# Patient Record
Sex: FEMALE | Race: WHITE | NOT HISPANIC OR LATINO | Employment: OTHER | URBAN - METROPOLITAN AREA
[De-identification: names, ages, dates, MRNs, and addresses within clinical notes are randomized per-mention and may not be internally consistent; named-entity substitution may affect disease eponyms.]

---

## 2017-01-25 ENCOUNTER — OFFICE VISIT (OUTPATIENT)
Dept: ORTHOPEDIC SURGERY | Facility: CLINIC | Age: 62
End: 2017-01-25

## 2017-01-25 DIAGNOSIS — Z98.890 S/P ROTATOR CUFF REPAIR: Primary | ICD-10-CM

## 2017-01-25 DIAGNOSIS — M54.12 CERVICAL RADICULOPATHY: ICD-10-CM

## 2017-01-25 PROCEDURE — 99212 OFFICE O/P EST SF 10 MIN: CPT | Performed by: ORTHOPAEDIC SURGERY

## 2017-01-25 NOTE — PROGRESS NOTES
"Jolie Del Cid : 1955 MRN: [id number] DATE: 2017      CC: 3 months s/p right shoulder rotator cuff repair    HPI: Pt. returns to clinic today stating pain is improved.  Motion is progressing.  Denies any new concerns or issues.  She admits that she has been non-compliant with the therapy recommendation.  She still has soreness in the shoulder and occasionally down the arm but she does admit that it is better than before surgery and seems to be steadily improving.  She also reports the occasional sensation that the arm \"goes dead\".      Current Outpatient Prescriptions:   •  buPROPion XL (WELLBUTRIN XL) 300 MG 24 hr tablet, Take 300 mg by mouth every morning., Disp: , Rfl:   •  buPROPion XL (WELLBUTRIN XL) 300 MG 24 hr tablet, TAKE ONE TABLET BY MOUTH DAILY, Disp: 30 tablet, Rfl: 0  •  carisoprodol (SOMA) 350 MG tablet, Take 1 tablet by mouth 4 (Four) Times a Day As Needed for muscle spasms., Disp: 120 tablet, Rfl: 1  •  HYDROcodone-acetaminophen (NORCO)  MG per tablet, Take 1 tablet by mouth Every 4 (Four) Hours As Needed for moderate pain (4-6)., Disp: 180 tablet, Rfl: 0  •  lisinopril-hydrochlorothiazide (PRINZIDE,ZESTORETIC) 20-25 MG per tablet, Take 1 tablet by mouth every morning., Disp: , Rfl:   •  lisinopril-hydrochlorothiazide (PRINZIDE,ZESTORETIC) 20-25 MG per tablet, TAKE ONE TABLET BY MOUTH DAILY, Disp: 30 tablet, Rfl: 0  •  LORazepam (ATIVAN) 1 MG tablet, Take 1 tablet by mouth Every 6 (Six) Hours As Needed for anxiety., Disp: 120 tablet, Rfl: 0  •  oxyCODONE (ROXICODONE) 10 MG tablet, Take 1 tablet by mouth Every 6 (Six) Hours As Needed for moderate pain (4-6)., Disp: 120 tablet, Rfl: 0  •  vitamin D (ERGOCALCIFEROL) 76235 UNITS capsule capsule, Take 1 capsule by mouth every 7 days. (Patient taking differently: Take 50,000 Units by mouth every 30 (thirty) days.), Disp: 4 capsule, Rfl: 3    Past Medical History   Diagnosis Date   • Anxiety      Anxiety and Stress  Anxiety/ Panic Attacks "   • Arm pain      severe and worsening  worsening by fall   • Back pain      ongoing treatment  Low back Pain with worsening spasms  due to recent move....needs breakthrough meds   • Chest congestion      ON RECENT ANTIBX   • Chest pain      rare   • Claustrophobia      WITH PANIC ATTACKS   • Encounter for annual health examination 03/13/2014     Annual Health Assessment   • Hand dysfunction      Dysfunctional right hand with dislocating right thumb   • Headache      Headaches due to BP accelerated   • Hyperlipidemia      off Lipitor  starting Lipitor and encouraged highly to take   • Hypertension      accelerated due to anxiety  better control  poorly controlled   • Injury of neck    • Intractable pain      and discomfort due to chronic arm injury and pain   • Muscle spasm of left shoulder      Left shoulder spasms after trip to [place]   • Neck pain    • Neuralgia      of Hands  Right arm Neuralgia and swelling   • Nosebleed      when off her BP meds   • Panic attack      when off Lexapro   • Poor compliance with medication      Poor medication compliance   • Pregnancy      Total Pregnancies: 2  Full Term: 2  Number of Living Children: 2   • Right arm pain    • Right eye trauma      Right eye trauma/hemical conjunctivitis   • Right hip pain    • Rotator cuff tear, right    • Sensitive skin    • Shoulder pain, right    • Situational anxiety      treated with Lorazepam built declines lexapro   • Strain of supraspinatus muscle      Supraspinatus muscle tear - ongoing, declines repair, okay with water therapy..refe...   • Viral gastroenteritis    • Wellness examination 11/20/2014     Annual Wellness Visit       Past Surgical History   Procedure Laterality Date   • Elbow procedure       x3   • Hysterectomy     • Colonoscopy       Every 10 Years   • [place] tooth extraction     • Tonsillectomy     • Appendectomy     • Carpal tunnel release Bilateral    • First rib resection     • Shoulder arthroscopy w/ rotator  cuff repair Right 9/20/2016     Procedure:  RIGHT SHOULDER ARTHROSCOPY WITH ROTATOR CUFF REPAIR AND BICEPS TENDONESIS;  Surgeon: Aris Andres MD;  [place] [facility] [place] [facility] [place]  Service:    • Back surgery  1981     Lower back scoliosis surgery   • Elbow procedure Left 1993     Left elbow tennis elbow nerve movement   • Carpal tunnel release  1995     Atasioy carpel Tunnel   • Shoulder surgery Right 2002     Atasioy right shoulder   • Other surgical history Right 08/01/2003     SURG   Right Arm Atasoy       Family History   Problem Relation Age of Onset   • Cancer Other      Prostate   • Heart disease Other    • Hypertension Other    • Arthritis Other    • Skin cancer Other    • Other Other      SLE       Social History     Social History   • Marital status:      Spouse name: N/A   • Number of children: N/A   • Years of education: N/A     Occupational History   • Not on file.     Social History Main Topics   • Smoking status: Current Every Day Smoker     Packs/day: 0.25     Years: 20.00     Types: Cigarettes   • Smokeless tobacco: Not on file      Comment: 1-2 CIGARETTES/ DAY   • Alcohol use No   • Drug use: No   • Sexual activity: Defer     Other Topics Concern   • Not on file     Social History Narrative     Exam:   Contour of shoulder appears normal.  Skin intact and benign.  Arm and forearm soft.  Motion is excellent--still lacking several levels of IR.  Good motor and sensory function distally.  Palpable pulses with good cap refill.      Imaging   none    Impression:  3 months s/p right shoulder rotator cuff repair, cervical radiculopathy    Plan:    1.  Progress ROM and strengthening as tolerated.  2.  Continue PT per protocol.  3.  F/u in 6 weeks.  4.  I think some of her pain is coming from her neck but we still need to give the shoulder more time.  I counseled her that soreness at this point is still normal.

## 2017-01-25 NOTE — MR AVS SNAPSHOT
Jolie Del Cid   1/25/2017 10:45 AM   Office Visit    Dept Phone:  [phone]   Encounter #:  [id number]    Provider:  Aris Andres MD   Department:  [facility]                Your Full Care Plan              Your Updated Medication List          This list is accurate as of: 1/25/17 11:45 AM.  Always use your most recent med list.                * buPROPion  MG 24 hr tablet   Commonly known as:  WELLBUTRIN XL       * buPROPion  MG 24 hr tablet   Commonly known as:  WELLBUTRIN XL   TAKE ONE TABLET BY MOUTH DAILY       carisoprodol 350 MG tablet   Commonly known as:  SOMA   Take 1 tablet by mouth 4 (Four) Times a Day As Needed for muscle spasms.       HYDROcodone-acetaminophen  MG per tablet   Commonly known as:  NORCO   Take 1 tablet by mouth Every 4 (Four) Hours As Needed for moderate pain (4-6).       * lisinopril-hydrochlorothiazide 20-25 MG per tablet   Commonly known as:  PRINZIDE,ZESTORETIC       * lisinopril-hydrochlorothiazide 20-25 MG per tablet   Commonly known as:  PRINZIDE,ZESTORETIC   TAKE ONE TABLET BY MOUTH DAILY       LORazepam 1 MG tablet   Commonly known as:  ATIVAN   Take 1 tablet by mouth Every 6 (Six) Hours As Needed for anxiety.       oxyCODONE 10 MG tablet   Commonly known as:  ROXICODONE   Take 1 tablet by mouth Every 6 (Six) Hours As Needed for moderate pain (4-6).       vitamin D 08247 UNITS capsule capsule   Commonly known as:  ERGOCALCIFEROL   Take 1 capsule by mouth every 7 days.       * Notice:  This list has 4 medication(s) that are the same as other medications prescribed for you. Read the directions carefully, and ask your doctor or other care provider to review them with you.            You Were Diagnosed With        Codes Comments    S/P rotator cuff repair    -  Primary ICD-10-CM: Z98.890  ICD-9-CM: V45.89     Cervical radiculopathy     ICD-10-CM: M54.12  ICD-9-CM: 723.4          Instructions     None    Patient Instructions History      Upcoming Appointments     Visit Type Date Time Department    FOLLOW UP 1/25/2017 10:45 AM [facility]    FOLLOW UP 3/10/2017 11:25 AM [facility]    FOLLOW UP 4/17/2017  3:10 PM [facility] Signup     Our records indicate that you have an active [facility] [employer] account.    You can view your After Visit Summary by going to [employer] and logging in with your [employer] username and password.  If you don't have a [employer] username and password but a parent or guardian has access to your record, the parent or guardian should login with their own [employer] username and password and access your record to view the After Visit Summary.    If you have questions, you can email [email or web address] or call [phone] to talk to our Riskified staff.  Remember, Riskified is NOT to be used for urgent needs.  For medical emergencies, dial 911.               Other Info from Your Visit           Your Appointments     Mar 10, 2017 11:25 AM EST   Follow Up with Aris Andres MD   [facility] (--)    [address]  [place] [address] [phone]           Arrive 15 minutes prior to appointment.            Apr 17, 2017  3:10 PM EDT   Follow Up with Sohan Rene MD   [facility] (--)    [address]  [place] [facility] [address] [phone]           Arrive 15 minutes prior to appointment.              Allergies     Ibuprofen  Hives    Lexapro [Escitalopram Oxalate]  Nausea And Vomiting, Anxiety, Other (See Comments)    Reaction: Drunk feeling/ Flying high/ Increased anxiety    Pristiq [Desvenlafaxine]  Nausea And Vomiting, Other (See Comments)    Reaction: mean      Reason for Visit     Right Shoulder - Pain, Follow-up           Vital Signs     Last Menstrual Period Smoking Status                (LMP  Unknown) Current Every Day Smoker          Problems and Diagnoses Noted     S/P rotator cuff repair    Cervical nerve root disorder

## 2017-01-26 DIAGNOSIS — F41.9 ANXIETY: ICD-10-CM

## 2017-01-26 RX ORDER — LORAZEPAM 1 MG/1
TABLET ORAL
Qty: 120 TABLET | Refills: 0 | OUTPATIENT
Start: 2017-01-26 | End: 2017-02-27 | Stop reason: SDUPTHER

## 2017-02-07 DIAGNOSIS — M75.21 BICIPITAL TENDONITIS OF RIGHT SHOULDER: ICD-10-CM

## 2017-02-07 NOTE — TELEPHONE ENCOUNTER
----- Message from Cally Segura sent at 2/7/2017  2:49 PM EST -----   [phone]  Refill on HYDROcodone-acetaminophen (NORCO)  MG per tablet    lov 12/28/16, rayray due 3/28/17

## 2017-02-08 DIAGNOSIS — M75.21 BICIPITAL TENDONITIS OF RIGHT SHOULDER: ICD-10-CM

## 2017-02-08 RX ORDER — OXYCODONE HYDROCHLORIDE 10 MG/1
10 TABLET ORAL EVERY 6 HOURS PRN
Qty: 120 TABLET | Refills: 0 | Status: SHIPPED | OUTPATIENT
Start: 2017-02-08 | End: 2017-03-13 | Stop reason: SDUPTHER

## 2017-02-08 RX ORDER — HYDROCODONE BITARTRATE AND ACETAMINOPHEN 10; 325 MG/1; MG/1
1 TABLET ORAL EVERY 4 HOURS PRN
Qty: 180 TABLET | Refills: 0 | Status: SHIPPED | OUTPATIENT
Start: 2017-02-08 | End: 2017-03-22 | Stop reason: SDUPTHER

## 2017-02-08 NOTE — TELEPHONE ENCOUNTER
----- Message from Petra Marshall sent at 2/8/2017  4:22 PM EST -----  Refill oxycodone 10 mg    Stated her  called in wrong med

## 2017-02-09 RX ORDER — CARISOPRODOL 350 MG/1
TABLET ORAL
Qty: 120 TABLET | Refills: 0 | OUTPATIENT
Start: 2017-02-09 | End: 2017-03-14 | Stop reason: SDUPTHER

## 2017-02-27 DIAGNOSIS — F41.9 ANXIETY: ICD-10-CM

## 2017-02-27 RX ORDER — BUPROPION HYDROCHLORIDE 300 MG/1
TABLET ORAL
Qty: 30 TABLET | Refills: 0 | Status: SHIPPED | OUTPATIENT
Start: 2017-02-27 | End: 2017-03-24 | Stop reason: SDUPTHER

## 2017-02-27 RX ORDER — LORAZEPAM 1 MG/1
TABLET ORAL
Qty: 120 TABLET | Refills: 0 | OUTPATIENT
Start: 2017-02-27 | End: 2017-03-22 | Stop reason: SDUPTHER

## 2017-02-27 RX ORDER — LISINOPRIL AND HYDROCHLOROTHIAZIDE 25; 20 MG/1; MG/1
TABLET ORAL
Qty: 30 TABLET | Refills: 0 | Status: SHIPPED | OUTPATIENT
Start: 2017-02-27

## 2017-03-13 ENCOUNTER — TELEPHONE (OUTPATIENT)
Dept: FAMILY MEDICINE CLINIC | Facility: CLINIC | Age: 62
End: 2017-03-13

## 2017-03-13 DIAGNOSIS — M75.21 BICIPITAL TENDONITIS OF RIGHT SHOULDER: ICD-10-CM

## 2017-03-13 RX ORDER — OXYCODONE HYDROCHLORIDE 10 MG/1
10 TABLET ORAL EVERY 6 HOURS PRN
Qty: 120 TABLET | Refills: 0 | Status: SHIPPED | OUTPATIENT
Start: 2017-03-13 | End: 2017-03-22 | Stop reason: SDUPTHER

## 2017-03-13 NOTE — TELEPHONE ENCOUNTER
----- Message from Shruti Ornelas sent at 3/13/2017 10:03 AM EDT -----  Regarding: RX REQUEST  oxyCODONE (ROXICODONE) 10 MG tablet

## 2017-03-14 RX ORDER — CARISOPRODOL 350 MG/1
TABLET ORAL
Qty: 120 TABLET | Refills: 0 | OUTPATIENT
Start: 2017-03-14 | End: 2017-04-11 | Stop reason: SDUPTHER

## 2017-03-14 RX ORDER — CARISOPRODOL 350 MG/1
TABLET ORAL
Qty: 120 TABLET | Refills: 0 | OUTPATIENT
Start: 2017-03-14 | End: 2017-03-24 | Stop reason: SDUPTHER

## 2017-03-22 DIAGNOSIS — M75.21 BICIPITAL TENDONITIS OF RIGHT SHOULDER: ICD-10-CM

## 2017-03-22 DIAGNOSIS — F41.9 ANXIETY: ICD-10-CM

## 2017-03-22 RX ORDER — HYDROCODONE BITARTRATE AND ACETAMINOPHEN 10; 325 MG/1; MG/1
1 TABLET ORAL EVERY 4 HOURS PRN
Qty: 180 TABLET | Refills: 0 | Status: SHIPPED | OUTPATIENT
Start: 2017-03-22 | End: 2017-04-17 | Stop reason: SDUPTHER

## 2017-03-22 RX ORDER — LORAZEPAM 1 MG/1
0.5 TABLET ORAL EVERY 6 HOURS PRN
Qty: 120 TABLET | Refills: 0 | Status: SHIPPED | OUTPATIENT
Start: 2017-03-22 | End: 2017-04-17 | Stop reason: SDUPTHER

## 2017-03-22 RX ORDER — OXYCODONE HYDROCHLORIDE 10 MG/1
10 TABLET ORAL EVERY 6 HOURS PRN
Qty: 120 TABLET | Refills: 0 | Status: SHIPPED | OUTPATIENT
Start: 2017-03-22 | End: 2017-04-17 | Stop reason: SDUPTHER

## 2017-03-24 ENCOUNTER — OFFICE VISIT (OUTPATIENT)
Dept: ORTHOPEDIC SURGERY | Facility: CLINIC | Age: 62
End: 2017-03-24

## 2017-03-24 VITALS — BODY MASS INDEX: 22.68 KG/M2 | HEIGHT: 63 IN | TEMPERATURE: 97.4 F | WEIGHT: 128 LBS

## 2017-03-24 DIAGNOSIS — Z09 SURGERY FOLLOW-UP: Primary | ICD-10-CM

## 2017-03-24 PROCEDURE — 99212 OFFICE O/P EST SF 10 MIN: CPT | Performed by: ORTHOPAEDIC SURGERY

## 2017-03-24 RX ORDER — LISINOPRIL AND HYDROCHLOROTHIAZIDE 25; 20 MG/1; MG/1
TABLET ORAL
Qty: 30 TABLET | Refills: 0 | Status: SHIPPED | OUTPATIENT
Start: 2017-03-24 | End: 2021-12-01

## 2017-03-24 RX ORDER — BUPROPION HYDROCHLORIDE 300 MG/1
TABLET ORAL
Qty: 30 TABLET | Refills: 0 | Status: SHIPPED | OUTPATIENT
Start: 2017-03-24 | End: 2017-04-16 | Stop reason: SDUPTHER

## 2017-03-24 NOTE — PROGRESS NOTES
"Chief Complaint:  Follow up s/p right rotator cuff repair    HPI:  Jolie is now roughly 5 months out from surgery.  She tells me the shoulder is doing \"okay\", as she states it.  She still has intermittent pain shooting down the arm as well as intermittent spasming.  She thinks that this is probably coming from the neck.  She describes her pain as moderate, intermittent, and sharp.  When asked to localize her pain, she says that it shoots all the way down her arm.  She also gets occasional pain shooting up into her neck.  Denies any new weakness or numbness.  She has experienced some occasional paresthesias.    Exam:  Her incisions are healed.  Motion is near full relative to the contralateral side.  She still has some residual weakness with elevation in scapular plane but no significant pain.  Neck motion is uncomfortable and a Spurling's maneuver reproduces neck pain only.    Imaging:  None    Assessment:  5 months s/p right rotator cuff repair    Plan:  Overall, I think she has done fairly well with the shoulder surgery.  She still has some pain and to what degree this is coming from the shoulder as opposed to the neck is difficult to determine.  I assured her that it can take up to a year to recover full motion and function and for all of the pain to subside after rotator cuff repair.  It is possible that she has a recurrent rotator cuff tear but I have assured her that that is not causing the pain shooting all the way down her arm or the paresthesias.  At this point, I think it would be reasonable to have her try epidurals for the neck to see if those help.  Alternatively, she can go back and talk to Dr. Johnson and see what he recommends.  She wants to adan it over.  She will let me know how she wants to proceed.  I will wait to hear from her    Aris Andres MD  03/24/2017      "

## 2017-04-11 RX ORDER — CARISOPRODOL 350 MG/1
TABLET ORAL
Qty: 120 TABLET | Refills: 0 | Status: SHIPPED | OUTPATIENT
Start: 2017-04-11 | End: 2017-05-08 | Stop reason: SDUPTHER

## 2017-04-17 ENCOUNTER — OFFICE VISIT (OUTPATIENT)
Dept: FAMILY MEDICINE CLINIC | Facility: CLINIC | Age: 62
End: 2017-04-17

## 2017-04-17 VITALS
DIASTOLIC BLOOD PRESSURE: 84 MMHG | HEIGHT: 63 IN | TEMPERATURE: 98.4 F | OXYGEN SATURATION: 98 % | SYSTOLIC BLOOD PRESSURE: 136 MMHG | WEIGHT: 125 LBS | HEART RATE: 92 BPM | BODY MASS INDEX: 22.15 KG/M2

## 2017-04-17 DIAGNOSIS — I10 HYPERTENSION, ESSENTIAL: ICD-10-CM

## 2017-04-17 DIAGNOSIS — G90.50 COMPLEX REGIONAL PAIN SYNDROME TYPE 1, AFFECTING UNSPECIFIED SITE: ICD-10-CM

## 2017-04-17 DIAGNOSIS — M75.21 BICIPITAL TENDONITIS OF RIGHT SHOULDER: ICD-10-CM

## 2017-04-17 DIAGNOSIS — Z79.899 ENCOUNTER FOR LONG-TERM (CURRENT) USE OF MEDICATIONS: ICD-10-CM

## 2017-04-17 DIAGNOSIS — E78.5 HYPERLIPIDEMIA, UNSPECIFIED HYPERLIPIDEMIA TYPE: ICD-10-CM

## 2017-04-17 DIAGNOSIS — G89.4 PAIN SYNDROME, CHRONIC: ICD-10-CM

## 2017-04-17 DIAGNOSIS — F32.A ANXIETY AND DEPRESSION: Primary | ICD-10-CM

## 2017-04-17 DIAGNOSIS — F41.9 ANXIETY: ICD-10-CM

## 2017-04-17 DIAGNOSIS — F41.9 ANXIETY AND DEPRESSION: Primary | ICD-10-CM

## 2017-04-17 PROCEDURE — 99214 OFFICE O/P EST MOD 30 MIN: CPT | Performed by: INTERNAL MEDICINE

## 2017-04-17 RX ORDER — BUPROPION HYDROCHLORIDE 300 MG/1
TABLET ORAL
Qty: 30 TABLET | Refills: 0 | Status: SHIPPED | OUTPATIENT
Start: 2017-04-17 | End: 2017-05-20 | Stop reason: SDUPTHER

## 2017-04-17 RX ORDER — OXYCODONE HYDROCHLORIDE 10 MG/1
10 TABLET ORAL EVERY 6 HOURS PRN
Qty: 90 TABLET | Refills: 0 | Status: SHIPPED | OUTPATIENT
Start: 2017-04-17 | End: 2017-06-14 | Stop reason: SDUPTHER

## 2017-04-17 RX ORDER — LORAZEPAM 1 MG/1
1 TABLET ORAL EVERY 6 HOURS PRN
Qty: 120 TABLET | Refills: 0 | Status: SHIPPED | OUTPATIENT
Start: 2017-04-17 | End: 2017-05-23 | Stop reason: SDUPTHER

## 2017-04-17 RX ORDER — HYDROCODONE BITARTRATE AND ACETAMINOPHEN 10; 325 MG/1; MG/1
1 TABLET ORAL EVERY 4 HOURS PRN
Qty: 180 TABLET | Refills: 0 | Status: SHIPPED | OUTPATIENT
Start: 2017-04-17 | End: 2017-06-14 | Stop reason: SDUPTHER

## 2017-04-17 RX ORDER — LISINOPRIL AND HYDROCHLOROTHIAZIDE 25; 20 MG/1; MG/1
TABLET ORAL
Qty: 30 TABLET | Refills: 0 | Status: SHIPPED | OUTPATIENT
Start: 2017-04-17 | End: 2021-12-01

## 2017-04-17 RX ORDER — HYDROCODONE BITARTRATE AND ACETAMINOPHEN 10; 325 MG/1; MG/1
1 TABLET ORAL EVERY 4 HOURS PRN
Qty: 180 TABLET | Refills: 0 | Status: SHIPPED | OUTPATIENT
Start: 2017-04-17 | End: 2017-04-17 | Stop reason: SDUPTHER

## 2017-04-17 NOTE — PROGRESS NOTES
QUICK REFERENCE INFORMATION:  The ABCs of the Annual Wellness Visit    Subsequent Medicare Wellness Visit    HEALTH RISK ASSESSMENT    1955    Recent Hospitalizations:  No recent hospitalization(s)..        Current Medical Providers:  Patient Care Team:  Sohan Rene MD as PCP - General        Smoking Status:  History   Smoking Status   • Current Every Day Smoker   • Packs/day: 0.25   • Years: 20.00   • Types: Cigarettes   Smokeless Tobacco   • Never Used     Comment: 1-2 CIGARETTES/ DAY       Alcohol Consumption:  History   Alcohol Use No       Depression Screen:   No flowsheet data found.    Health Habits and Functional and Cognitive Screening:  No flowsheet data found.           Does the patient have evidence of cognitive impairment? No    Aspirin use counseling: Taking ASA appropriately as indicated      Recent Lab Results:  CMP:  Lab Results   Component Value Date    BUN 16 09/15/2016    CREATININE 0.82 09/15/2016    EGFRIFNONA 70 09/15/2016    EGFRIFAFRI  09/15/2016      Comment:      <15 Indicative of kidney failure.    BCR 19.5 09/15/2016     09/15/2016    K 4.2 09/15/2016    CO2 22.9 09/15/2016    CALCIUM 10.2 09/15/2016     Lipid Panel:     HbA1c:       Visual Acuity:  No exam data present    Age-appropriate Screening Schedule:  Refer to the list below for future screening recommendations based on patient's age, sex and/or medical conditions. Orders for these recommended tests are listed in the plan section. The patient has been provided with a written plan.    Health Maintenance   Topic Date Due   • PNEUMOCOCCAL VACCINE (19-64 MEDIUM RISK) (1 of 1 - PPSV23) 09/04/1972   • TDAP/TD VACCINES (1 - Tdap) 09/04/1972   • MAMMOGRAM  05/09/2016   • PAP SMEAR  05/09/2016   • COLONOSCOPY  05/09/2016   • ZOSTER VACCINE  05/09/2016   • INFLUENZA VACCINE  08/01/2016   • LIPID PANEL  09/06/2016        Subjective   History of Present Illness    Jolie Del Cid is a 61 y.o. female who presents for an Subsequent  Wellness Visit.    The following portions of the patient's history were reviewed and updated as appropriate: She  has a past medical history of Anxiety; Arm pain; Back pain; Chest congestion; Chest pain; Claustrophobia; Encounter for annual health examination (03/13/2014); Hand dysfunction; Headache; Hyperlipidemia; Hypertension; Injury of neck; Intractable pain; Muscle spasm of left shoulder; Neck pain; Neuralgia; Nosebleed; Panic attack; Poor compliance with medication; Pregnancy; Right arm pain; Right eye trauma; Right hip pain; Rotator cuff tear, right; Sensitive skin; Shoulder pain, right; Situational anxiety; Strain of supraspinatus muscle; Viral gastroenteritis; and Wellness examination (11/20/2014).  She has Hypertension, essential; Hyperlipidemia intolerant to statin; Intractable neuropathic pain of upper extremity; Supraspinatus tendon tear (Andres); Headache; Pain syndrome, chronic; Vitamin D deficiency; and Complex regional pain syndrome on her pertinent problem list.  She  has a past surgical history that includes Elbow surgery; Hysterectomy; Colonoscopy; [place] tooth extraction; Tonsillectomy; Appendectomy; Carpal tunnel release (Bilateral); First rib removal; Shoulder arthroscopy w/ rotator cuff repair (Right, 9/20/2016); Back surgery (1981); Elbow surgery (Left, 1993); Carpal tunnel release (1995); Shoulder surgery (Right, 2002); and Other surgical history (Right, 08/01/2003).  Her family history includes Arthritis in her other; Cancer in her other; Heart disease in her other; Hypertension in her other; Other in her other; Skin cancer in her other.  She  reports that she has been smoking Cigarettes.  She has a 5.00 pack-year smoking history. She has never used smokeless tobacco. She reports that she does not drink alcohol or use illicit drugs.  Current Outpatient Prescriptions   Medication Sig Dispense Refill   • buPROPion XL (WELLBUTRIN XL) 300 MG 24 hr tablet TAKE ONE TABLET BY MOUTH DAILY 30  tablet 0   • carisoprodol (SOMA) 350 MG tablet TAKE 1 TABLET BY MOUTH FOUR TIMES DAILY AS NEEDED MUSCLE SPASM 120 tablet 0   • HYDROcodone-acetaminophen (NORCO)  MG per tablet Take 1 tablet by mouth Every 4 (Four) Hours As Needed for Moderate Pain (4-6). 180 tablet 0   • lisinopril-hydrochlorothiazide (PRINZIDE,ZESTORETIC) 20-25 MG per tablet TAKE ONE TABLET BY MOUTH DAILY 30 tablet 0   • lisinopril-hydrochlorothiazide (PRINZIDE,ZESTORETIC) 20-25 MG per tablet TAKE ONE TABLET BY MOUTH DAILY 30 tablet 0   • lisinopril-hydrochlorothiazide (PRINZIDE,ZESTORETIC) 20-25 MG per tablet TAKE ONE TABLET BY MOUTH DAILY 30 tablet 0   • LORazepam (ATIVAN) 1 MG tablet Take 1 tablet by mouth Every 6 (Six) Hours As Needed for Anxiety. Okay to fill on 4-17-17 120 tablet 0   • oxyCODONE (ROXICODONE) 10 MG tablet Take 1 tablet by mouth Every 6 (Six) Hours As Needed for Moderate Pain (4-6) or Severe Pain (7-10). 90 tablet 0   • vitamin D (ERGOCALCIFEROL) 17831 UNITS capsule capsule Take 1 capsule by mouth every 7 days. (Patient taking differently: Take 50,000 Units by mouth every 30 (thirty) days.) 4 capsule 3     No current facility-administered medications for this visit.      Current Outpatient Prescriptions on File Prior to Visit   Medication Sig   • buPROPion XL (WELLBUTRIN XL) 300 MG 24 hr tablet TAKE ONE TABLET BY MOUTH DAILY   • carisoprodol (SOMA) 350 MG tablet TAKE 1 TABLET BY MOUTH FOUR TIMES DAILY AS NEEDED MUSCLE SPASM   • lisinopril-hydrochlorothiazide (PRINZIDE,ZESTORETIC) 20-25 MG per tablet TAKE ONE TABLET BY MOUTH DAILY   • lisinopril-hydrochlorothiazide (PRINZIDE,ZESTORETIC) 20-25 MG per tablet TAKE ONE TABLET BY MOUTH DAILY   • lisinopril-hydrochlorothiazide (PRINZIDE,ZESTORETIC) 20-25 MG per tablet TAKE ONE TABLET BY MOUTH DAILY   • vitamin D (ERGOCALCIFEROL) 75536 UNITS capsule capsule Take 1 capsule by mouth every 7 days. (Patient taking differently: Take 50,000 Units by mouth every 30 (thirty) days.)   •  [DISCONTINUED] HYDROcodone-acetaminophen (NORCO)  MG per tablet Take 1 tablet by mouth Every 4 (Four) Hours As Needed for Moderate Pain (4-6).   • [DISCONTINUED] LORazepam (ATIVAN) 1 MG tablet Take 0.5 tablets by mouth Every 6 (Six) Hours As Needed for Anxiety.   • [DISCONTINUED] oxyCODONE (ROXICODONE) 10 MG tablet Take 1 tablet by mouth Every 6 (Six) Hours As Needed for Moderate Pain (4-6).     No current facility-administered medications on file prior to visit.      She is allergic to ibuprofen; lexapro [escitalopram oxalate]; and pristiq [desvenlafaxine]..    Outpatient Medications Prior to Visit   Medication Sig Dispense Refill   • buPROPion XL (WELLBUTRIN XL) 300 MG 24 hr tablet TAKE ONE TABLET BY MOUTH DAILY 30 tablet 0   • carisoprodol (SOMA) 350 MG tablet TAKE 1 TABLET BY MOUTH FOUR TIMES DAILY AS NEEDED MUSCLE SPASM 120 tablet 0   • lisinopril-hydrochlorothiazide (PRINZIDE,ZESTORETIC) 20-25 MG per tablet TAKE ONE TABLET BY MOUTH DAILY 30 tablet 0   • lisinopril-hydrochlorothiazide (PRINZIDE,ZESTORETIC) 20-25 MG per tablet TAKE ONE TABLET BY MOUTH DAILY 30 tablet 0   • lisinopril-hydrochlorothiazide (PRINZIDE,ZESTORETIC) 20-25 MG per tablet TAKE ONE TABLET BY MOUTH DAILY 30 tablet 0   • vitamin D (ERGOCALCIFEROL) 43486 UNITS capsule capsule Take 1 capsule by mouth every 7 days. (Patient taking differently: Take 50,000 Units by mouth every 30 (thirty) days.) 4 capsule 3   • HYDROcodone-acetaminophen (NORCO)  MG per tablet Take 1 tablet by mouth Every 4 (Four) Hours As Needed for Moderate Pain (4-6). 180 tablet 0   • LORazepam (ATIVAN) 1 MG tablet Take 0.5 tablets by mouth Every 6 (Six) Hours As Needed for Anxiety. 120 tablet 0   • oxyCODONE (ROXICODONE) 10 MG tablet Take 1 tablet by mouth Every 6 (Six) Hours As Needed for Moderate Pain (4-6). 120 tablet 0     No facility-administered medications prior to visit.        Patient Active Problem List   Diagnosis   • Hypertension, essential   • Anxiety  "and depression   • Hyperlipidemia intolerant to statin   • Intractable neuropathic pain of upper extremity   • Supraspinatus tendon tear (Dmitry)   • Panic attack (Lorazepam declines Lexpro)   • Headache   • Upper back pain   • Pain syndrome, chronic   • Bleeding nose   • Poor compliance with medication   • Dysfunctional movements right hand and arm   • Vitamin D deficiency   • Complex regional pain syndrome   • S/P rotator cuff repair right 9/21/16 Nicolette   • Bicipital tendonitis of right shoulder repaired by Dmitry 9/20/16       Advance Care Planning:  has NO advance directive - information provided to the patient today    Identification of Risk Factors:  Risk factors include: tobacco use, increased fall risk, depression and polypharmacy.    Review of Systems    Compared to one year ago, the patient feels her physical health is the same.  Compared to one year ago, the patient feels her mental health is the same.    Objective     Physical Exam    Vitals:    04/17/17 1801   BP: 136/84   BP Location: Left arm   Patient Position: Sitting   Pulse: 92   Temp: 98.4 °F (36.9 °C)   TempSrc: Oral   SpO2: 98%   Weight: 125 lb (56.7 kg)   Height: 63\" (160 cm)   PainSc: 6  Comment: bilateral   PainLoc: Shoulder       Body mass index is 22.14 kg/(m^2).  Discussed the patient's BMI with her. The BMI is in the acceptable range.    Assessment/Plan   Patient Self-Management and Personalized Health Advice  The patient has been provided with information about: diet and exercise and preventive services including:   · Fall Risk plan of care done.    Visit Diagnoses:    ICD-10-CM ICD-9-CM   1. Anxiety and depression F41.9 300.00    F32.9 311   2. Complex regional pain syndrome type 1, affecting unspecified site G90.50 337.20   3. Hyperlipidemia, unspecified hyperlipidemia type E78.5 272.4   4. Hypertension, essential I10 401.9   5. Pain syndrome, chronic G89.4 338.4   6. Encounter for long-term (current) use of medications Z79.899 " V58.69   7. Anxiety F41.9 300.00   8. Bicipital tendonitis of right shoulder repaired by Andres 9/20/16 M75.21 726.12       Orders Placed This Encounter   Procedures   • Comprehensive Metabolic Panel   • Lipid Panel With LDL / HDL Ratio   • Thyroid Panel With TSH       Outpatient Encounter Prescriptions as of 4/17/2017   Medication Sig Dispense Refill   • buPROPion XL (WELLBUTRIN XL) 300 MG 24 hr tablet TAKE ONE TABLET BY MOUTH DAILY 30 tablet 0   • carisoprodol (SOMA) 350 MG tablet TAKE 1 TABLET BY MOUTH FOUR TIMES DAILY AS NEEDED MUSCLE SPASM 120 tablet 0   • HYDROcodone-acetaminophen (NORCO)  MG per tablet Take 1 tablet by mouth Every 4 (Four) Hours As Needed for Moderate Pain (4-6). 180 tablet 0   • lisinopril-hydrochlorothiazide (PRINZIDE,ZESTORETIC) 20-25 MG per tablet TAKE ONE TABLET BY MOUTH DAILY 30 tablet 0   • lisinopril-hydrochlorothiazide (PRINZIDE,ZESTORETIC) 20-25 MG per tablet TAKE ONE TABLET BY MOUTH DAILY 30 tablet 0   • lisinopril-hydrochlorothiazide (PRINZIDE,ZESTORETIC) 20-25 MG per tablet TAKE ONE TABLET BY MOUTH DAILY 30 tablet 0   • LORazepam (ATIVAN) 1 MG tablet Take 1 tablet by mouth Every 6 (Six) Hours As Needed for Anxiety. Okay to fill on 4-17-17 120 tablet 0   • oxyCODONE (ROXICODONE) 10 MG tablet Take 1 tablet by mouth Every 6 (Six) Hours As Needed for Moderate Pain (4-6) or Severe Pain (7-10). 90 tablet 0   • vitamin D (ERGOCALCIFEROL) 41341 UNITS capsule capsule Take 1 capsule by mouth every 7 days. (Patient taking differently: Take 50,000 Units by mouth every 30 (thirty) days.) 4 capsule 3   • [DISCONTINUED] HYDROcodone-acetaminophen (NORCO)  MG per tablet Take 1 tablet by mouth Every 4 (Four) Hours As Needed for Moderate Pain (4-6). 180 tablet 0   • [DISCONTINUED] HYDROcodone-acetaminophen (NORCO)  MG per tablet Take 1 tablet by mouth Every 4 (Four) Hours As Needed for Moderate Pain (4-6). 180 tablet 0   • [DISCONTINUED] LORazepam (ATIVAN) 1 MG tablet Take 0.5  tablets by mouth Every 6 (Six) Hours As Needed for Anxiety. 120 tablet 0   • [DISCONTINUED] oxyCODONE (ROXICODONE) 10 MG tablet Take 1 tablet by mouth Every 6 (Six) Hours As Needed for Moderate Pain (4-6). 120 tablet 0     No facility-administered encounter medications on file as of 4/17/2017.        Reviewed use of high risk medication in the elderly: yes  Reviewed for potential of harmful drug interactions in the elderly: yes    Follow Up:  No Follow-up on file.     An After Visit Summary and PPPS with all of these plans were given to the patient.

## 2017-04-17 NOTE — PATIENT INSTRUCTIONS
Diagnoses and all orders for this visit:    Anxiety and depression  Comments:  Continue anxiety and depression  Needs refill on lorazepam  Orders:  -     CBC & Differential  -     Comprehensive Metabolic Panel  -     Lipid Panel With LDL / HDL Ratio  -     Thyroid Panel With TSH    Complex regional pain syndrome type 1, affecting unspecified site  Comments:  Refill meds  Follow up as planned  Feels good at present time  Orders:  -     CBC & Differential  -     Comprehensive Metabolic Panel  -     Lipid Panel With LDL / HDL Ratio  -     Thyroid Panel With TSH    Hyperlipidemia, unspecified hyperlipidemia type  Comments:  Monitor cholesterol level at present time  Follow up if symptoms change or worsen  Orders:  -     CBC & Differential  -     Comprehensive Metabolic Panel  -     Lipid Panel With LDL / HDL Ratio  -     Thyroid Panel With TSH    Hypertension, essential  Comments:  Continue same meds  Avoid salt ast present time  Orders:  -     CBC & Differential  -     Comprehensive Metabolic Panel  -     Lipid Panel With LDL / HDL Ratio  -     Thyroid Panel With TSH    Pain syndrome, chronic  Comments:  Refill meds  DOMINIQUE reviewed  Follow up as planned  Orders:  -     CBC & Differential  -     Comprehensive Metabolic Panel  -     Lipid Panel With LDL / HDL Ratio  -     Thyroid Panel With TSH    Encounter for long-term (current) use of medications  -     CBC & Differential  -     Comprehensive Metabolic Panel  -     Lipid Panel With LDL / HDL Ratio  -     Thyroid Panel With TSH    Anxiety  -     LORazepam (ATIVAN) 1 MG tablet; Take 1 tablet by mouth Every 6 (Six) Hours As Needed for Anxiety. Okay to fill on 4-17-17    Bicipital tendonitis of right shoulder repaired by Dmitry 9/20/16  Comments:  resume pain meds by gilbert FOX feviewed  Tx plan update  Orders:  -     Discontinue: HYDROcodone-acetaminophen (NORCO)  MG per tablet; Take 1 tablet by mouth Every 4 (Four) Hours As Needed for Moderate Pain (4-6).  -      HYDROcodone-acetaminophen (NORCO)  MG per tablet; Take 1 tablet by mouth Every 4 (Four) Hours As Needed for Moderate Pain (4-6).  -     oxyCODONE (ROXICODONE) 10 MG tablet; Take 1 tablet by mouth Every 6 (Six) Hours As Needed for Moderate Pain (4-6) or Severe Pain (7-10).

## 2017-04-17 NOTE — PROGRESS NOTES
Jolie Del Cid is a 61 y.o. female   Chief Complaint   Patient presents with   • Pain     pt is been same ,no changes       DOMINIQUE Coppola: Per House Bill #1 Requirements and [place] Board of Medical Licensure Regulations for prescribing of Schedule II and Schedule III with Hydrocodone, and other controlled medications for which the Board requires [facility] reporting and regulation, the following drug treatment plan was developed and reviewed with the patient on the date of this encounter:              Controlled medication(s) taken: Norco and oxycodone for breakthrough pain every 8 hours          Medical Indication (including pain relief and/or other physical and psychoosocial functional issue) for treatment: Back and neck pain          Further Diagnostic tests, consultations, or treatments needed: Follpows up with Kehinde            Plans for review of plan, adjustment and waning dose and further [facility] evaluation include:quarterly                  Risk for medication abuse for this patient based on physician review is felt to be extremely low.    Subjective   Hypertension   This is a chronic problem. The current episode started more than 1 year ago. The problem has been waxing and waning since onset. The problem is uncontrolled. Associated symptoms include neck pain. Pertinent negatives include no anxiety, blurred vision, chest pain, headaches, malaise/fatigue, palpitations, peripheral edema, PND, shortness of breath or sweats. There are no associated agents to hypertension. There are no known risk factors for coronary artery disease. Past treatments include nothing. The current treatment provides significant improvement. There are no compliance problems.  There is no history of angina, kidney disease, CAD/MI, CVA, heart failure, left ventricular hypertrophy, PVD, renovascular disease, retinopathy or a thyroid problem. There is no history of chronic renal disease, coarctation of the aorta,  "hyperaldosteronism, hypercortisolism, hyperparathyroidism, a hypertension causing med, pheochromocytoma or sleep apnea.        Jolie Del Cid is a 61 y.o.female who presents with:ongoing pain issues.  Right arm still extreme;ly painful and uncomfortable.  Still soreness in the arm and leg.  Dr. Johnson felt that neck surgery issue is ongoing.  Nerves get pinched at times.  Nerve block mimicked the exact same pain she has in chest. Off to [place] at present.      Blood pressure under good control.      The following portions of the patient's history were reviewed and updated as appropriate: past medical history, surgeries, family history, allergies, current medications, past social history and problem list.    A comprehensive review of 14 systems was peformed  Review of Systems   Constitutional: Negative.  Negative for malaise/fatigue.   HENT: Negative.    Eyes: Negative.  Negative for blurred vision.   Respiratory: Negative.  Negative for shortness of breath.    Cardiovascular: Negative for chest pain, palpitations and PND.   Gastrointestinal: Negative.    Endocrine: Negative.    Genitourinary: Negative.    Musculoskeletal: Positive for arthralgias, back pain, joint swelling, myalgias and neck pain.   Skin: Negative.    Allergic/Immunologic: Negative.    Neurological: Negative.  Negative for headaches.   Hematological: Negative.    Psychiatric/Behavioral: Positive for agitation and sleep disturbance. The patient is nervous/anxious and is hyperactive.        I have reviewed the patient's medical history in detail and updated the computerized patient record.      Objective   Vitals:    04/17/17 1801   BP: 136/84   BP Location: Left arm   Patient Position: Sitting   Pulse: 92   Temp: 98.4 °F (36.9 °C)   TempSrc: Oral   SpO2: 98%   Weight: 125 lb (56.7 kg)   Height: 63\" (160 cm)   PainSc: 6  Comment: bilateral   PainLoc: Shoulder           Physical Exam   Constitutional: She appears well-developed and well-nourished. "   HENT:   Head: Normocephalic and atraumatic.   Right Ear: External ear normal.   Left Ear: External ear normal.   Nose: Nose normal.   Mouth/Throat: Oropharynx is clear and moist.   Eyes: Conjunctivae and EOM are normal. Pupils are equal, round, and reactive to light.   Neck: Normal range of motion. Neck supple.   Cardiovascular: Normal rate, regular rhythm, normal heart sounds and intact distal pulses.    Pulmonary/Chest: Effort normal and breath sounds normal.   Abdominal: Soft. Bowel sounds are normal.   Musculoskeletal: Normal range of motion.   Neurological: She is alert. She has normal reflexes.   Skin: Skin is warm and dry.        Psychiatric: She has a normal mood and affect. Her behavior is normal. Judgment and thought content normal.   Nursing note and vitals reviewed.      Procedures                          Assessment/Plan     Diagnoses and all orders for this visit:    Anxiety and depression  Comments:  Continue anxiety and depression  Needs refill on lorazepam  Orders:  -     CBC & Differential  -     Comprehensive Metabolic Panel  -     Lipid Panel With LDL / HDL Ratio  -     Thyroid Panel With TSH    Complex regional pain syndrome type 1, affecting unspecified site  Comments:  Refill meds  Follow up as planned  Feels good at present time  Orders:  -     CBC & Differential  -     Comprehensive Metabolic Panel  -     Lipid Panel With LDL / HDL Ratio  -     Thyroid Panel With TSH    Hyperlipidemia, unspecified hyperlipidemia type  Comments:  Monitor cholesterol level at present time  Follow up if symptoms change or worsen  Orders:  -     CBC & Differential  -     Comprehensive Metabolic Panel  -     Lipid Panel With LDL / HDL Ratio  -     Thyroid Panel With TSH    Hypertension, essential  Comments:  Continue same meds  Avoid salt ast present time  Orders:  -     CBC & Differential  -     Comprehensive Metabolic Panel  -     Lipid Panel With LDL / HDL Ratio  -     Thyroid Panel With TSH    Pain  syndrome, chronic  Comments:  Refill meds  DOMINIQUE reviewed  Follow up as planned  Orders:  -     CBC & Differential  -     Comprehensive Metabolic Panel  -     Lipid Panel With LDL / HDL Ratio  -     Thyroid Panel With TSH    Encounter for long-term (current) use of medications  -     CBC & Differential  -     Comprehensive Metabolic Panel  -     Lipid Panel With LDL / HDL Ratio  -     Thyroid Panel With TSH    Anxiety  -     LORazepam (ATIVAN) 1 MG tablet; Take 1 tablet by mouth Every 6 (Six) Hours As Needed for Anxiety. Okay to fill on 4-17-17    Bicipital tendonitis of right shoulder repaired by Dmitry 9/20/16  Comments:  resume pain meds by gilbert FOX feviewed  Tx plan update  Orders:  -     Discontinue: HYDROcodone-acetaminophen (NORCO)  MG per tablet; Take 1 tablet by mouth Every 4 (Four) Hours As Needed for Moderate Pain (4-6).  -     HYDROcodone-acetaminophen (NORCO)  MG per tablet; Take 1 tablet by mouth Every 4 (Four) Hours As Needed for Moderate Pain (4-6).  -     oxyCODONE (ROXICODONE) 10 MG tablet; Take 1 tablet by mouth Every 6 (Six) Hours As Needed for Moderate Pain (4-6) or Severe Pain (7-10).           Sohan Rene MD  4/17/2017  6:04 PM

## 2017-04-18 LAB
ALBUMIN SERPL-MCNC: 4.3 G/DL (ref 3.6–4.8)
ALBUMIN/GLOB SERPL: 1.9 {RATIO} (ref 1.2–2.2)
ALP SERPL-CCNC: 102 IU/L (ref 39–117)
ALT SERPL-CCNC: 19 IU/L (ref 0–32)
AST SERPL-CCNC: 15 IU/L (ref 0–40)
BASOPHILS # BLD AUTO: 0.2 X10E3/UL (ref 0–0.2)
BASOPHILS NFR BLD AUTO: 2 %
BILIRUB SERPL-MCNC: <0.2 MG/DL (ref 0–1.2)
BUN SERPL-MCNC: 15 MG/DL (ref 8–27)
BUN/CREAT SERPL: 20 (ref 12–28)
CALCIUM SERPL-MCNC: 9.2 MG/DL (ref 8.7–10.3)
CHLORIDE SERPL-SCNC: 97 MMOL/L (ref 96–106)
CHOLEST SERPL-MCNC: 229 MG/DL (ref 100–199)
CO2 SERPL-SCNC: 22 MMOL/L (ref 18–29)
CREAT SERPL-MCNC: 0.74 MG/DL (ref 0.57–1)
EOSINOPHIL # BLD AUTO: 0.2 X10E3/UL (ref 0–0.4)
EOSINOPHIL NFR BLD AUTO: 3 %
ERYTHROCYTE [DISTWIDTH] IN BLOOD BY AUTOMATED COUNT: 12.6 % (ref 12.3–15.4)
FT4I SERPL CALC-MCNC: 2 (ref 1.2–4.9)
GLOBULIN SER CALC-MCNC: 2.3 G/DL (ref 1.5–4.5)
GLUCOSE SERPL-MCNC: 99 MG/DL (ref 65–99)
HCT VFR BLD AUTO: 38.1 % (ref 34–46.6)
HDLC SERPL-MCNC: 51 MG/DL
HGB BLD-MCNC: 12.9 G/DL (ref 11.1–15.9)
IMM GRANULOCYTES # BLD: 0 X10E3/UL (ref 0–0.1)
IMM GRANULOCYTES NFR BLD: 0 %
LDLC SERPL CALC-MCNC: 142 MG/DL (ref 0–99)
LDLC/HDLC SERPL: 2.8 RATIO UNITS (ref 0–3.2)
LYMPHOCYTES # BLD AUTO: 2 X10E3/UL (ref 0.7–3.1)
LYMPHOCYTES NFR BLD AUTO: 27 %
MCH RBC QN AUTO: 33 PG (ref 26.6–33)
MCHC RBC AUTO-ENTMCNC: 33.9 G/DL (ref 31.5–35.7)
MCV RBC AUTO: 97 FL (ref 79–97)
MONOCYTES # BLD AUTO: 0.7 X10E3/UL (ref 0.1–0.9)
MONOCYTES NFR BLD AUTO: 9 %
NEUTROPHILS # BLD AUTO: 4.3 X10E3/UL (ref 1.4–7)
NEUTROPHILS NFR BLD AUTO: 59 %
PLATELET # BLD AUTO: 311 X10E3/UL (ref 150–379)
POTASSIUM SERPL-SCNC: 4.9 MMOL/L (ref 3.5–5.2)
PROT SERPL-MCNC: 6.6 G/DL (ref 6–8.5)
RBC # BLD AUTO: 3.91 X10E6/UL (ref 3.77–5.28)
SODIUM SERPL-SCNC: 135 MMOL/L (ref 134–144)
T3RU NFR SERPL: 32 % (ref 24–39)
T4 SERPL-MCNC: 6.4 UG/DL (ref 4.5–12)
TRIGL SERPL-MCNC: 179 MG/DL (ref 0–149)
TSH SERPL DL<=0.005 MIU/L-ACNC: 0.7 UIU/ML (ref 0.45–4.5)
VLDLC SERPL CALC-MCNC: 36 MG/DL (ref 5–40)
WBC # BLD AUTO: 7.3 X10E3/UL (ref 3.4–10.8)

## 2017-05-09 RX ORDER — CARISOPRODOL 350 MG/1
TABLET ORAL
Qty: 120 TABLET | Refills: 0 | OUTPATIENT
Start: 2017-05-09 | End: 2017-06-08 | Stop reason: SDUPTHER

## 2017-05-22 RX ORDER — LISINOPRIL AND HYDROCHLOROTHIAZIDE 25; 20 MG/1; MG/1
TABLET ORAL
Qty: 30 TABLET | Refills: 0 | Status: SHIPPED | OUTPATIENT
Start: 2017-05-22 | End: 2021-12-01

## 2017-05-22 RX ORDER — BUPROPION HYDROCHLORIDE 300 MG/1
TABLET ORAL
Qty: 30 TABLET | Refills: 0 | Status: SHIPPED | OUTPATIENT
Start: 2017-05-22

## 2017-05-23 DIAGNOSIS — F41.9 ANXIETY: ICD-10-CM

## 2017-05-23 RX ORDER — LORAZEPAM 1 MG/1
TABLET ORAL
Qty: 120 TABLET | Refills: 0 | Status: SHIPPED | OUTPATIENT
Start: 2017-05-23 | End: 2017-06-14 | Stop reason: SDUPTHER

## 2017-06-09 RX ORDER — CARISOPRODOL 350 MG/1
TABLET ORAL
Qty: 120 TABLET | Refills: 0 | Status: SHIPPED | OUTPATIENT
Start: 2017-06-09 | End: 2017-06-12 | Stop reason: SDUPTHER

## 2017-06-12 RX ORDER — CARISOPRODOL 350 MG/1
350 TABLET ORAL 4 TIMES DAILY PRN
Qty: 120 TABLET | Refills: 0 | OUTPATIENT
Start: 2017-06-12 | End: 2017-06-14 | Stop reason: SDUPTHER

## 2017-06-14 DIAGNOSIS — F41.9 ANXIETY: ICD-10-CM

## 2017-06-14 DIAGNOSIS — M75.21 BICIPITAL TENDONITIS OF RIGHT SHOULDER: ICD-10-CM

## 2017-06-14 RX ORDER — CARISOPRODOL 350 MG/1
350 TABLET ORAL 4 TIMES DAILY PRN
Qty: 120 TABLET | Refills: 0 | Status: SHIPPED | OUTPATIENT
Start: 2017-06-14 | End: 2021-12-01

## 2017-06-14 RX ORDER — OXYCODONE HYDROCHLORIDE 10 MG/1
10 TABLET ORAL EVERY 6 HOURS PRN
Qty: 90 TABLET | Refills: 0 | Status: SHIPPED | OUTPATIENT
Start: 2017-06-14 | End: 2021-12-01

## 2017-06-14 RX ORDER — LORAZEPAM 1 MG/1
1 TABLET ORAL EVERY 6 HOURS PRN
Qty: 120 TABLET | Refills: 0 | Status: SHIPPED | OUTPATIENT
Start: 2017-06-14

## 2017-06-14 RX ORDER — HYDROCODONE BITARTRATE AND ACETAMINOPHEN 10; 325 MG/1; MG/1
1 TABLET ORAL EVERY 4 HOURS PRN
Qty: 180 TABLET | Refills: 0 | Status: SHIPPED | OUTPATIENT
Start: 2017-06-14

## 2017-06-14 RX ORDER — CARISOPRODOL 350 MG/1
350 TABLET ORAL 4 TIMES DAILY PRN
Qty: 120 TABLET | Refills: 0 | OUTPATIENT
Start: 2017-06-14 | End: 2017-06-14 | Stop reason: SDUPTHER

## 2017-07-17 RX ORDER — BUPROPION HYDROCHLORIDE 300 MG/1
TABLET ORAL
Qty: 30 TABLET | Refills: 0 | OUTPATIENT
Start: 2017-07-17

## 2017-07-17 RX ORDER — LISINOPRIL AND HYDROCHLOROTHIAZIDE 25; 20 MG/1; MG/1
TABLET ORAL
Qty: 30 TABLET | Refills: 0 | OUTPATIENT
Start: 2017-07-17

## 2021-09-17 ENCOUNTER — HOSPITAL ENCOUNTER (OUTPATIENT)
Dept: GENERAL RADIOLOGY | Facility: HOSPITAL | Age: 66
Discharge: HOME | End: 2021-09-17
Admitting: INTERNAL MEDICINE

## 2021-09-17 ENCOUNTER — TRANSCRIBE ORDERS (OUTPATIENT)
Dept: GENERAL RADIOLOGY | Facility: HOSPITAL | Age: 66
End: 2021-09-17

## 2021-09-17 DIAGNOSIS — M25.462 SWOLLEN L KNEE: Primary | ICD-10-CM

## 2021-09-17 PROCEDURE — 73562 X-RAY EXAM OF KNEE 3: CPT

## 2021-12-01 ENCOUNTER — PRE-ADMISSION TESTING (OUTPATIENT)
Dept: PREADMISSION TESTING | Facility: HOSPITAL | Age: 66
End: 2021-12-01

## 2021-12-01 VITALS
RESPIRATION RATE: 16 BRPM | HEIGHT: 63 IN | OXYGEN SATURATION: 97 % | BODY MASS INDEX: 28.17 KG/M2 | WEIGHT: 159 LBS | HEART RATE: 91 BPM | SYSTOLIC BLOOD PRESSURE: 140 MMHG | DIASTOLIC BLOOD PRESSURE: 78 MMHG

## 2021-12-01 LAB
ANION GAP SERPL CALCULATED.3IONS-SCNC: 10.5 MMOL/L (ref 5–15)
BUN SERPL-MCNC: 16 MG/DL (ref 8–23)
BUN/CREAT SERPL: 18 (ref 7–25)
CALCIUM SPEC-SCNC: 9 MG/DL (ref 8.6–10.5)
CHLORIDE SERPL-SCNC: 98 MMOL/L (ref 98–107)
CO2 SERPL-SCNC: 24.5 MMOL/L (ref 22–29)
CREAT SERPL-MCNC: 0.89 MG/DL (ref 0.57–1)
DEPRECATED RDW RBC AUTO: 44.7 FL (ref 37–54)
ERYTHROCYTE [DISTWIDTH] IN BLOOD BY AUTOMATED COUNT: 11.7 % (ref 12.3–15.4)
GFR SERPL CREATININE-BSD FRML MDRD: 63 ML/MIN/1.73
GLUCOSE SERPL-MCNC: 130 MG/DL (ref 65–99)
HCT VFR BLD AUTO: 41.2 % (ref 34–46.6)
HGB BLD-MCNC: 13 G/DL (ref 12–15.9)
MCH RBC QN AUTO: 31.9 PG (ref 26.6–33)
MCHC RBC AUTO-ENTMCNC: 31.6 G/DL (ref 31.5–35.7)
MCV RBC AUTO: 101.2 FL (ref 79–97)
PLATELET # BLD AUTO: 250 10*3/MM3 (ref 140–450)
PMV BLD AUTO: 8.8 FL (ref 6–12)
POTASSIUM SERPL-SCNC: 4.2 MMOL/L (ref 3.5–5.2)
QT INTERVAL: 376 MS
RBC # BLD AUTO: 4.07 10*6/MM3 (ref 3.77–5.28)
SODIUM SERPL-SCNC: 133 MMOL/L (ref 136–145)
WBC NRBC COR # BLD: 6.58 10*3/MM3 (ref 3.4–10.8)

## 2021-12-01 PROCEDURE — 36415 COLL VENOUS BLD VENIPUNCTURE: CPT

## 2021-12-01 PROCEDURE — 93010 ELECTROCARDIOGRAM REPORT: CPT | Performed by: INTERNAL MEDICINE

## 2021-12-01 PROCEDURE — 80048 BASIC METABOLIC PNL TOTAL CA: CPT | Performed by: UROLOGY

## 2021-12-01 PROCEDURE — 93005 ELECTROCARDIOGRAM TRACING: CPT

## 2021-12-01 PROCEDURE — 85027 COMPLETE CBC AUTOMATED: CPT | Performed by: UROLOGY

## 2021-12-01 RX ORDER — BISOPROLOL FUMARATE AND HYDROCHLOROTHIAZIDE 2.5; 6.25 MG/1; MG/1
1 TABLET ORAL DAILY
COMMUNITY

## 2021-12-01 RX ORDER — ROSUVASTATIN CALCIUM 10 MG/1
10 TABLET, COATED ORAL EVERY OTHER DAY
COMMUNITY

## 2021-12-01 RX ORDER — AMLODIPINE BESYLATE 5 MG/1
5 TABLET ORAL AS NEEDED
COMMUNITY

## 2021-12-01 NOTE — PAT
Pt here for PAT visit.  Pre-op tests completed, chg soap given, and instructions reviewed.  Instructed clears until 2 hrs prior to arrival time, voiced understanding. Covid 12/6

## 2021-12-01 NOTE — DISCHARGE INSTRUCTIONS
PRE-ADMISSION TESTING INSTRUCTIONS FOR ADULTS    Take these medications the morning of surgery with a small sip of water: Amlodipine,  Ativan       No aspirin, advil, aleve, ibuprofen, naproxen, diet pills, decongestants, or herbal/vitamins for a week prior to surgery.    General Instructions:    • Do not eat solid food after midnight the night before surgery.  No gum, mints, or hard candy after midnight the night before surgery.  • You may drink clear liquids the day of surgery up until 2 hours before your arrival time.  • Clear liquids are liquids you can see through. Nothing RED in color.    Plain water    Sports drinks  Sodas     Gelatin (Jell-O)  Fruit juices without pulp such as white grape juice and apple juice  Popsicles that contain no fruit or yogurt  Tea or coffee (no cream or milk added)    • It is beneficial for you to have a clear drink that contains carbohydrates just before you leave your house and before your fasting time begins.  We suggest a 20 ounce bottle of Gatorade or Powerade for non-diabetic patients or a 20 ounce bottle of G2 or Powerade Zero for diabetic patients.     • Patients who avoid smoking, chewing tobacco and alcohol for 4 weeks prior to surgery have a reduced risk of post-operative complications.  If at all possible, quit smoking as many days before surgery as you can.    • Do not smoke, use chewing tobacco or drink alcohol the day of surgery    • Bring your C-PAP/ BI-PAP machine if you use one.  • Wear clean comfortable clothes and socks.  • Do not wear contact lenses, lotion, deodorant, or make-up.  Bring a case for your glasses if applicable. You may brush your teeth the morning of surgery.  • You may wear dentures/partials, do not put adhesive/glue on them.    • Leave all other jewelry and valuables at home.      Preventing a Surgical Site Infection:    • Shower the night before and on the morning of surgery using the chlorhexidine soap you were given.  Use a clean washcloth  with the soap.  Place clean sheets on your bed after showering the night before surgery. Do not use the CHG soap on your hair, face, or private areas. Wash your body gently for five (5) minutes. Do not scrub your skin.  Dry with a clean towel and dress in clean clothing.    • Do not shave the surgical area for 10 days-2 weeks prior to surgery  because the razor can irritate skin and make it easier to develop an infection.  • Make sure you, your family, and all healthcare providers clean their hands with soap and water or an alcohol based hand  before caring for you or your wound.      Day of surgery:    Your surgeon’s office will advise you of your arrival time for the day of surgery.    Upon arrival, a Pre-op nurse and Anesthesia provider will review your health history, obtain vital signs, and answer questions you may have.  The only belongings needed at this time will be your home medications and if applicable your C-PAP/BI-PAP machine.  If you are staying overnight your family can leave the rest of your belongings in the car and bring them to your room later.  A Pre-op nurse will start an IV and you may receive medication in preparation for surgery, including something to help you relax.  Your family will be able to see you in the Pre-op area.  While you are in surgery your family should notify the waiting room  if they leave the waiting room area and provide a contact phone number.    IF you have any questions, you can call the Pre-Admission Department at [phone] or your surgeon's office.  Notify your surgeon if  you become sick, have a fever, productive cough, or cannot be here the day of surgery    Please be aware that surgery does come with discomfort.  We want to make every effort to control your discomfort so please discuss any uncontrolled symptoms with your nurse.   Your doctor will most likely have prescribed pain medications.      If you are going home after surgery, you  will receive individualized written care instructions before being discharged.  A responsible adult (over the age of 18) must drive you to and from the hospital on the day of your surgery and stay with you for 24 hours after anesthesia.    If you are staying overnight following surgery, you will be transported to your hospital room following the recovery period.  [facility] has all private rooms.    You may receive a survey regarding the care you received. Your feedback is very important and will be used to collect the necessary data to help us to continue to provide excellent care.     Deductibles and co-payments are collected on the day of service. Please be prepared to pay the required co-pay, deductible or deposit on the day of service as defined by your plan.

## 2021-12-06 ENCOUNTER — APPOINTMENT (OUTPATIENT)
Dept: PREADMISSION TESTING | Facility: HOSPITAL | Age: 66
End: 2021-12-06

## 2021-12-06 LAB — SARS-COV-2 RNA PNL SPEC NAA+PROBE: NOT DETECTED

## 2021-12-06 PROCEDURE — C9803 HOPD COVID-19 SPEC COLLECT: HCPCS

## 2021-12-06 PROCEDURE — U0004 COV-19 TEST NON-CDC HGH THRU: HCPCS

## 2021-12-06 PROCEDURE — U0005 INFEC AGEN DETEC AMPLI PROBE: HCPCS

## 2021-12-07 ENCOUNTER — ANESTHESIA EVENT (OUTPATIENT)
Dept: PERIOP | Facility: HOSPITAL | Age: 66
End: 2021-12-07

## 2021-12-08 ENCOUNTER — HOSPITAL ENCOUNTER (OUTPATIENT)
Facility: HOSPITAL | Age: 66
Setting detail: HOSPITAL OUTPATIENT SURGERY
Discharge: HOME | End: 2021-12-08
Attending: UROLOGY | Admitting: UROLOGY

## 2021-12-08 ENCOUNTER — ANESTHESIA (OUTPATIENT)
Dept: PERIOP | Facility: HOSPITAL | Age: 66
End: 2021-12-08

## 2021-12-08 VITALS
SYSTOLIC BLOOD PRESSURE: 123 MMHG | TEMPERATURE: 97.5 F | HEART RATE: 91 BPM | DIASTOLIC BLOOD PRESSURE: 76 MMHG | WEIGHT: 153.8 LBS | BODY MASS INDEX: 27.24 KG/M2 | OXYGEN SATURATION: 95 % | RESPIRATION RATE: 20 BRPM

## 2021-12-08 DIAGNOSIS — N81.6 CYSTOCELE WITH RECTOCELE: ICD-10-CM

## 2021-12-08 DIAGNOSIS — K46.9 ENTEROCELE: Primary | ICD-10-CM

## 2021-12-08 DIAGNOSIS — N81.10 CYSTOCELE WITH RECTOCELE: ICD-10-CM

## 2021-12-08 PROCEDURE — 25010000002 HYDROMORPHONE 1 MG/ML SOLUTION: Performed by: NURSE ANESTHETIST, CERTIFIED REGISTERED

## 2021-12-08 PROCEDURE — 25010000002 NEOSTIGMINE 10 MG/10ML SOLUTION: Performed by: NURSE ANESTHETIST, CERTIFIED REGISTERED

## 2021-12-08 PROCEDURE — 0 CEFAZOLIN SODIUM-DEXTROSE 2-3 GM-%(50ML) RECONSTITUTED SOLUTION: Performed by: UROLOGY

## 2021-12-08 PROCEDURE — 25010000002 ONDANSETRON PER 1 MG: Performed by: NURSE ANESTHETIST, CERTIFIED REGISTERED

## 2021-12-08 PROCEDURE — 25010000002 DEXAMETHASONE PER 1 MG: Performed by: NURSE ANESTHETIST, CERTIFIED REGISTERED

## 2021-12-08 PROCEDURE — 25010000002 PROPOFOL 10 MG/ML EMULSION: Performed by: NURSE ANESTHETIST, CERTIFIED REGISTERED

## 2021-12-08 PROCEDURE — 25010000002 PHENYLEPHRINE 10 MG/ML SOLUTION: Performed by: NURSE ANESTHETIST, CERTIFIED REGISTERED

## 2021-12-08 PROCEDURE — 25010000002 MIDAZOLAM PER 1MG: Performed by: NURSE ANESTHETIST, CERTIFIED REGISTERED

## 2021-12-08 RX ORDER — MIDAZOLAM HYDROCHLORIDE 2 MG/2ML
0.5 INJECTION, SOLUTION INTRAMUSCULAR; INTRAVENOUS
Status: DISCONTINUED | OUTPATIENT
Start: 2021-12-08 | End: 2021-12-08 | Stop reason: HOSPADM

## 2021-12-08 RX ORDER — LIDOCAINE HYDROCHLORIDE 10 MG/ML
0.5 INJECTION, SOLUTION EPIDURAL; INFILTRATION; INTRACAUDAL; PERINEURAL ONCE AS NEEDED
Status: DISCONTINUED | OUTPATIENT
Start: 2021-12-08 | End: 2021-12-08 | Stop reason: HOSPADM

## 2021-12-08 RX ORDER — DEXMEDETOMIDINE HYDROCHLORIDE 100 UG/ML
INJECTION, SOLUTION INTRAVENOUS AS NEEDED
Status: DISCONTINUED | OUTPATIENT
Start: 2021-12-08 | End: 2021-12-08 | Stop reason: SURG

## 2021-12-08 RX ORDER — CEPHALEXIN 500 MG/1
500 CAPSULE ORAL 3 TIMES DAILY
Qty: 21 CAPSULE | Refills: 0 | Status: SHIPPED | OUTPATIENT
Start: 2021-12-08 | End: 2021-12-15

## 2021-12-08 RX ORDER — EPHEDRINE SULFATE 50 MG/ML
INJECTION, SOLUTION INTRAVENOUS AS NEEDED
Status: DISCONTINUED | OUTPATIENT
Start: 2021-12-08 | End: 2021-12-08 | Stop reason: SURG

## 2021-12-08 RX ORDER — FAMOTIDINE 10 MG/ML
20 INJECTION, SOLUTION INTRAVENOUS
Status: COMPLETED | OUTPATIENT
Start: 2021-12-08 | End: 2021-12-08

## 2021-12-08 RX ORDER — SODIUM CHLORIDE 0.9 % (FLUSH) 0.9 %
10 SYRINGE (ML) INJECTION EVERY 12 HOURS SCHEDULED
Status: DISCONTINUED | OUTPATIENT
Start: 2021-12-08 | End: 2021-12-08 | Stop reason: HOSPADM

## 2021-12-08 RX ORDER — SODIUM CHLORIDE 0.9 % (FLUSH) 0.9 %
10 SYRINGE (ML) INJECTION AS NEEDED
Status: DISCONTINUED | OUTPATIENT
Start: 2021-12-08 | End: 2021-12-08 | Stop reason: HOSPADM

## 2021-12-08 RX ORDER — ONDANSETRON 2 MG/ML
4 INJECTION INTRAMUSCULAR; INTRAVENOUS ONCE AS NEEDED
Status: COMPLETED | OUTPATIENT
Start: 2021-12-08 | End: 2021-12-08

## 2021-12-08 RX ORDER — GLYCOPYRROLATE 0.2 MG/ML
INJECTION INTRAMUSCULAR; INTRAVENOUS AS NEEDED
Status: DISCONTINUED | OUTPATIENT
Start: 2021-12-08 | End: 2021-12-08 | Stop reason: SURG

## 2021-12-08 RX ORDER — HYDROMORPHONE HYDROCHLORIDE 4 MG/1
4 TABLET ORAL EVERY 4 HOURS PRN
Qty: 20 TABLET | Refills: 0 | Status: SHIPPED | OUTPATIENT
Start: 2021-12-08 | End: 2022-12-08

## 2021-12-08 RX ORDER — NEOSTIGMINE METHYLSULFATE 1 MG/ML
INJECTION, SOLUTION INTRAVENOUS AS NEEDED
Status: DISCONTINUED | OUTPATIENT
Start: 2021-12-08 | End: 2021-12-08 | Stop reason: SURG

## 2021-12-08 RX ORDER — FENTANYL CITRATE 50 UG/ML
50 INJECTION, SOLUTION INTRAMUSCULAR; INTRAVENOUS
Status: DISCONTINUED | OUTPATIENT
Start: 2021-12-08 | End: 2021-12-08 | Stop reason: HOSPADM

## 2021-12-08 RX ORDER — ROCURONIUM BROMIDE 10 MG/ML
INJECTION, SOLUTION INTRAVENOUS AS NEEDED
Status: DISCONTINUED | OUTPATIENT
Start: 2021-12-08 | End: 2021-12-08 | Stop reason: SURG

## 2021-12-08 RX ORDER — CEFAZOLIN SODIUM 2 G/50ML
2 SOLUTION INTRAVENOUS ONCE
Status: COMPLETED | OUTPATIENT
Start: 2021-12-08 | End: 2021-12-08

## 2021-12-08 RX ORDER — PHENYLEPHRINE HYDROCHLORIDE 10 MG/ML
INJECTION INTRAVENOUS AS NEEDED
Status: DISCONTINUED | OUTPATIENT
Start: 2021-12-08 | End: 2021-12-08 | Stop reason: SURG

## 2021-12-08 RX ORDER — OXYCODONE HYDROCHLORIDE AND ACETAMINOPHEN 5; 325 MG/1; MG/1
1 TABLET ORAL ONCE AS NEEDED
Status: COMPLETED | OUTPATIENT
Start: 2021-12-08 | End: 2021-12-08

## 2021-12-08 RX ORDER — DEXAMETHASONE SODIUM PHOSPHATE 4 MG/ML
8 INJECTION, SOLUTION INTRA-ARTICULAR; INTRALESIONAL; INTRAMUSCULAR; INTRAVENOUS; SOFT TISSUE ONCE AS NEEDED
Status: COMPLETED | OUTPATIENT
Start: 2021-12-08 | End: 2021-12-08

## 2021-12-08 RX ORDER — SODIUM CHLORIDE, SODIUM LACTATE, POTASSIUM CHLORIDE, CALCIUM CHLORIDE 600; 310; 30; 20 MG/100ML; MG/100ML; MG/100ML; MG/100ML
9 INJECTION, SOLUTION INTRAVENOUS CONTINUOUS
Status: DISCONTINUED | OUTPATIENT
Start: 2021-12-08 | End: 2021-12-08 | Stop reason: HOSPADM

## 2021-12-08 RX ORDER — PROPOFOL 10 MG/ML
VIAL (ML) INTRAVENOUS AS NEEDED
Status: DISCONTINUED | OUTPATIENT
Start: 2021-12-08 | End: 2021-12-08 | Stop reason: SURG

## 2021-12-08 RX ORDER — LIDOCAINE HYDROCHLORIDE AND EPINEPHRINE 10; 10 MG/ML; UG/ML
INJECTION, SOLUTION INFILTRATION; PERINEURAL AS NEEDED
Status: DISCONTINUED | OUTPATIENT
Start: 2021-12-08 | End: 2021-12-08 | Stop reason: HOSPADM

## 2021-12-08 RX ORDER — SODIUM CHLORIDE 9 MG/ML
40 INJECTION, SOLUTION INTRAVENOUS AS NEEDED
Status: DISCONTINUED | OUTPATIENT
Start: 2021-12-08 | End: 2021-12-08 | Stop reason: HOSPADM

## 2021-12-08 RX ORDER — ONDANSETRON 2 MG/ML
4 INJECTION INTRAMUSCULAR; INTRAVENOUS ONCE AS NEEDED
Status: DISCONTINUED | OUTPATIENT
Start: 2021-12-08 | End: 2021-12-08 | Stop reason: HOSPADM

## 2021-12-08 RX ORDER — KETAMINE HYDROCHLORIDE 10 MG/ML
INJECTION INTRAMUSCULAR; INTRAVENOUS AS NEEDED
Status: DISCONTINUED | OUTPATIENT
Start: 2021-12-08 | End: 2021-12-08 | Stop reason: SURG

## 2021-12-08 RX ORDER — MAGNESIUM HYDROXIDE 1200 MG/15ML
LIQUID ORAL AS NEEDED
Status: DISCONTINUED | OUTPATIENT
Start: 2021-12-08 | End: 2021-12-08 | Stop reason: HOSPADM

## 2021-12-08 RX ADMIN — EPHEDRINE SULFATE 10 MG: 50 INJECTION, SOLUTION INTRAVENOUS at 09:07

## 2021-12-08 RX ADMIN — PHENYLEPHRINE HYDROCHLORIDE 150 MCG: 10 INJECTION INTRAVENOUS at 09:20

## 2021-12-08 RX ADMIN — DEXMEDETOMIDINE 4 MCG: 100 INJECTION, SOLUTION, CONCENTRATE INTRAVENOUS at 08:25

## 2021-12-08 RX ADMIN — CEFAZOLIN SODIUM 2 G: 2 SOLUTION INTRAVENOUS at 08:24

## 2021-12-08 RX ADMIN — MIDAZOLAM HYDROCHLORIDE 0.5 MG: 1 INJECTION, SOLUTION INTRAMUSCULAR; INTRAVENOUS at 08:13

## 2021-12-08 RX ADMIN — PHENYLEPHRINE HYDROCHLORIDE 150 MCG: 10 INJECTION INTRAVENOUS at 09:07

## 2021-12-08 RX ADMIN — KETAMINE HYDROCHLORIDE 25 MG: 10 INJECTION, SOLUTION INTRAMUSCULAR; INTRAVENOUS at 08:22

## 2021-12-08 RX ADMIN — DEXMEDETOMIDINE 4 MCG: 100 INJECTION, SOLUTION, CONCENTRATE INTRAVENOUS at 08:28

## 2021-12-08 RX ADMIN — NEOSTIGMINE METHYLSULFATE 4 MG: 1 INJECTION INTRAVENOUS at 09:27

## 2021-12-08 RX ADMIN — PROPOFOL 100 MG: 10 INJECTION, EMULSION INTRAVENOUS at 08:22

## 2021-12-08 RX ADMIN — PHENYLEPHRINE HYDROCHLORIDE 150 MCG: 10 INJECTION INTRAVENOUS at 08:42

## 2021-12-08 RX ADMIN — GLYCOPYRROLATE 0.6 MG: 0.2 INJECTION INTRAMUSCULAR; INTRAVENOUS at 09:27

## 2021-12-08 RX ADMIN — EPHEDRINE SULFATE 5 MG: 50 INJECTION, SOLUTION INTRAVENOUS at 09:20

## 2021-12-08 RX ADMIN — PHENYLEPHRINE HYDROCHLORIDE 150 MCG: 10 INJECTION INTRAVENOUS at 08:52

## 2021-12-08 RX ADMIN — DEXMEDETOMIDINE 4 MCG: 100 INJECTION, SOLUTION, CONCENTRATE INTRAVENOUS at 08:22

## 2021-12-08 RX ADMIN — EPHEDRINE SULFATE 10 MG: 50 INJECTION, SOLUTION INTRAVENOUS at 08:57

## 2021-12-08 RX ADMIN — SODIUM CHLORIDE, POTASSIUM CHLORIDE, SODIUM LACTATE AND CALCIUM CHLORIDE 9 ML/HR: 600; 310; 30; 20 INJECTION, SOLUTION INTRAVENOUS at 07:32

## 2021-12-08 RX ADMIN — FAMOTIDINE 20 MG: 10 INJECTION INTRAVENOUS at 07:31

## 2021-12-08 RX ADMIN — HYDROMORPHONE HYDROCHLORIDE 0.5 MG: 1 INJECTION, SOLUTION INTRAMUSCULAR; INTRAVENOUS; SUBCUTANEOUS at 09:54

## 2021-12-08 RX ADMIN — PHENYLEPHRINE HYDROCHLORIDE 150 MCG: 10 INJECTION INTRAVENOUS at 08:36

## 2021-12-08 RX ADMIN — PROPOFOL 50 MG: 10 INJECTION, EMULSION INTRAVENOUS at 08:23

## 2021-12-08 RX ADMIN — ONDANSETRON 4 MG: 2 INJECTION INTRAMUSCULAR; INTRAVENOUS at 07:31

## 2021-12-08 RX ADMIN — ROCURONIUM BROMIDE 40 MG: 10 INJECTION INTRAVENOUS at 08:23

## 2021-12-08 RX ADMIN — DEXAMETHASONE SODIUM PHOSPHATE 8 MG: 4 INJECTION, SOLUTION INTRAMUSCULAR; INTRAVENOUS at 07:32

## 2021-12-08 RX ADMIN — OXYCODONE HYDROCHLORIDE AND ACETAMINOPHEN 1 TABLET: 5; 325 TABLET ORAL at 10:46

## 2021-12-08 RX ADMIN — PHENYLEPHRINE HYDROCHLORIDE 150 MCG: 10 INJECTION INTRAVENOUS at 08:57

## 2021-12-08 NOTE — H&P
[facility] Surgical History and Physical    Patient Care Team:  Sohan Rene MD as PCP - General (Internal Medicine)    Chief complaint pelvic prolapse    Subjective     Patient is a 66 y.o. female presents with anterior prolapse with enterocele component for definitive repair.     Review of Systems   Pertinent items are noted in HPI    Past Medical History:   Diagnosis Date   • Anxiety     Anxiety and Stress  Anxiety/ Panic Attacks   • Arm pain     severe and worsening  worsening by fall   • Back pain     ongoing treatment  Low back Pain with worsening spasms  due to recent move....needs breakthrough meds   • Chest congestion     ON RECENT ANTIBX   • Chest pain     rare   • Claustrophobia     WITH PANIC ATTACKS   • Encounter for annual health examination 03/13/2014    Annual Health Assessment   • Hand dysfunction     Dysfunctional right hand with dislocating right thumb   • Headache     Headaches due to BP accelerated   • Hyperlipidemia     off Lipitor  starting Lipitor and encouraged highly to take   • Hypertension     accelerated due to anxiety  better control  poorly controlled   • Injury of neck    • Intractable pain     and discomfort due to chronic arm injury and pain   • Muscle spasm of left shoulder     Left shoulder spasms after trip to [place]   • Neck pain    • Neuralgia     of Hands  Right arm Neuralgia and swelling   • Nosebleed     when off her BP meds   • Panic attack     when off Lexapro   • Poor compliance with medication     Poor medication compliance   • Pregnancy     Total Pregnancies: 2  Full Term: 2  Number of Living Children: 2   • Right arm pain    • Right eye trauma     Right eye trauma/hemical conjunctivitis   • Right hip pain    • Rotator cuff tear, right    • Sensitive skin    • Shoulder pain, right    • Situational anxiety     treated with Lorazepam built declines lexapro   • Strain of supraspinatus muscle     Supraspinatus muscle tear - ongoing, declines repair,  okay with water therapy..refe...   • Viral gastroenteritis    • Wellness examination 11/20/2014    Annual Wellness Visit     Past Surgical History:   Procedure Laterality Date   • APPENDECTOMY     • BACK SURGERY  1981    Lower back scoliosis surgery   • CARPAL TUNNEL RELEASE Bilateral    • CARPAL TUNNEL RELEASE  1995 At[place] carpel Tunnel   • COLONOSCOPY      Every 10 Years   • ELBOW PROCEDURE      x3   • ELBOW PROCEDURE Left 1993    Left elbow tennis elbow nerve movement   • FIRST RIB RESECTION     • HYSTERECTOMY     • OTHER SURGICAL HISTORY Right 08/01/2003    SURG   Right Arm Atasoy   • SHOULDER ARTHROSCOPY W/ ROTATOR CUFF REPAIR Right 9/20/2016    Procedure:  RIGHT SHOULDER ARTHROSCOPY WITH ROTATOR CUFF REPAIR AND BICEPS TENDONESIS;  Surgeon: Aris Andres MD;  Location: [facility] OR [facility];  Service:    • SHOULDER SURGERY Right 2002 At[place] right shoulder   • TONSILLECTOMY     • WISDOM TOOTH EXTRACTION       Family History   Problem Relation Age of Onset   • Cancer Other         Prostate   • Heart disease Other    • Hypertension Other    • Arthritis Other    • Skin cancer Other    • Other Other         SLE     Social History     Tobacco Use   • Smoking status: Current Every Day Smoker     Packs/day: 0.25     Years: 20.00     Pack years: 5.00     Types: Cigarettes   • Smokeless tobacco: Never Used   • Tobacco comment: 1-2 CIGARETTES/ DAY   Vaping Use   • Vaping Use: Never used   Substance Use Topics   • Alcohol use: No   • Drug use: No       Meds:  Medications Prior to Admission   Medication Sig Dispense Refill Last Dose   • bisoprolol-hydrochlorothiazide (ZIAC) 2.5-6.25 MG per tablet Take 1 tablet by mouth Daily.   12/7/2021 at 0800   • buPROPion XL (WELLBUTRIN XL) 300 MG 24 hr tablet TAKE ONE TABLET BY MOUTH DAILY 30 tablet 0 12/7/2021 at 0800   • HYDROcodone-acetaminophen (NORCO)  MG per tablet Take 1 tablet by mouth Every 4 (Four) Hours As Needed for Moderate Pain (4-6). 180 tablet 0 12/7/2021 at  1900   • lisinopril-hydrochlorothiazide (PRINZIDE,ZESTORETIC) 20-25 MG per tablet TAKE ONE TABLET BY MOUTH DAILY 30 tablet 0 12/7/2021 at 0800   • LORazepam (ATIVAN) 1 MG tablet Take 1 tablet by mouth Every 6 (Six) Hours As Needed for Anxiety. (Patient taking differently: Take 1 mg by mouth Daily.) 120 tablet 0 12/7/2021 at 0800   • vitamin D (ERGOCALCIFEROL) 38340 UNITS capsule capsule Take 1 capsule by mouth every 7 days. (Patient taking differently: Take 50,000 Units by mouth every 30 (thirty) days.) 4 capsule 3 Past Month at Unknown time   • amLODIPine (Norvasc) 5 MG tablet Take 5 mg by mouth As Needed.   More than a month at Unknown time   • rosuvastatin (CRESTOR) 10 MG tablet Take 10 mg by mouth Every Other Day.   12/6/2021       Allergies:  Ibuprofen, Lexapro [escitalopram oxalate], and Pristiq [desvenlafaxine]    Debilities:  None    Objective     Vital Signs  Temp:  [97.8 °F (36.6 °C)] 97.8 °F (36.6 °C)  Heart Rate:  [91] 91  Resp:  [16] 16  BP: (115)/(90) 115/90  No intake or output data in the 24 hours ending 12/08/21 0813  Flowsheet Rows      First Filed Value   Admission Height --   Admission Weight 69.8 kg (153 lb 12.8 oz) Documented at 12/08/2021 0647           Physical Exam:     General Appearance:    Alert, cooperative, NAD   HEENT:    No trauma, pupils reactive, hearing intact   Back:     No CVA tenderness   Lungs:     Respirations unlabored, no wheezing    Heart:    RRR, intact peripheral pulses   Abdomen:     Soft, NDNT, no masses, no guarding   :   Anterior prolapse with enterocele   Extremities:   No edema, no deformity   Lymphatic:   No neck or groin LAD   Skin:   No bleeding, bruising or rashes   Neuro/Psych:   Orientation intact, mood/affect pleasant, no focal findings     Results Review:    I reviewed the patient's new clinical results.  Results for orders placed or performed in visit on 12/06/21   COVID-19, APTIMA PANTHER [facility]URENT IN-HOUSE NP/OP SWAB IN UTM/VTM/[facility] TRANSPORT MEDIA 24HR TAT  - Swab, Nasopharynx    Specimen: Nasopharynx; Swab   Result Value Ref Range    COVID19 Not Detected Not Detected - Ref. Range        Assessment:  Pelvic prolapse    Plan:    Anterior repair with enterocele correction.    I discussed the patient's findings and my recommendations with patient.   Risks, complications, outcomes and alternatives discussed with the patient at the bedside and office.    Joseph Low MD  12/08/21  08:13 EST

## 2021-12-08 NOTE — PERIOPERATIVE NURSING NOTE
Pt voided unmeasured amount of clear pink tinged urine per bedpan without complaints or difficulty.

## 2021-12-08 NOTE — OP NOTE
ANTERIOR AND POSTERIOR VAGINAL REPAIR  Procedure Note    Jolie Del Cid  12/8/2021    Pre-op Diagnosis:   Pelvic prolapse with large enterocele    Post-op Diagnosis:     Post-Op Diagnosis Codes:     * Enterocele [K46.9]     * Cystocele with rectocele [N81.10, N81.6]    Procedure(s):  ANTERIOR AND POSTERIOR VAGINAL REPAIR; ENTEROCELE REPAIR    Surgeon(s):  Joseph Low MD    Anesthesia: General    Staff:   Circulator: Peggy Shepard RN  Scrub Person: Kayleigh Zepeda    Estimated Blood Loss: 100ml    Specimens:                * No orders in the log *      Drains: * No LDAs found *    Findings: Large enterocele with posterior and anterior opponents.    Complications: None apparent    Indications: 66-year-old female with a large enterocele has both anterior and posterior components now presents for definitive repair    Procedure: Patient was taken the op suite given general anesthesia.  1% have been pain.  She is placed in lithotomy.  Prepped and draped in sterile fashion.  AP were retracted laterally with 2-0 silk.  Carefully examined her pelvis.  Her urethra showed a mild carbuncle and posterior prolapse but it was minimal of the mucosa.  Examination showed a very large enterocele coming out through the introitus and she had an anterior posterior component.  I infiltrated the anterior posterior and the enterocele wall of the vagina with 1% lidocaine with epinephrine and made a very elongated incision from the mid bladder through the enterocele and then all the way down to almost the posterior fourchette.  I began mobilizing the vaginal flaps until I could completely mobilized this enterocele component..  Try to open up the sac it was densely adherent fairly thickened.  I don't think there is any small bowel that I can feel in the sac but I did not get into the peritoneum.  I began by repairing the posterior aspect of this 1st with figure-of-eight 2-0 Vicryl sutures of the perirectal fascia and repaired  this 1st.  It was mainly the most posterior aspect of it near the enterocele.  Similar fashion the back wall of the anterior component was repaired with figure-of-eight sutures reducing this.  She really had no urethral hypermobility.  I then began closing the enterocele in a pursestring fashion with serial pursestrings until this is completely obliterated.  I trimmed up all the vaginal flaps.  I repaired the posterior vagina 1st with a locking 0 Vicryl all the way to the back of the vagina and then I closed the anterior portion a similar fashion with 0 locking Vicryl.  That remained was this large redundant vagina from the prior cervical scar and where the enterocele had been emanating from.  I trimmed all this up and then I closed this in a pursestring fashion with 2-0 Vicryl and closed it nicely.  She still had a fairly elongated vaginal vault and she had good posterior and anterior support.  I placed a Betadine soaked vaginal pack.  I did not put a catheter in.  We'll let her go home she should be able to void.  She'll remove her vaginal pack tomorrow.  She'll follow up in the office in a couple weeks.      Joseph Low MD     Date: 12/8/2021  Time: 09:41 EST

## 2021-12-08 NOTE — ANESTHESIA POSTPROCEDURE EVALUATION
Patient: Jolie Del Cid    Procedure Summary     Date: 12/08/21 Room / Location:  [facility] /  [facility]    Anesthesia Start: 0818 Anesthesia Stop: 0940    Procedure: ANTERIOR AND POSTERIOR VAGINAL REPAIR; ENTEROCELE REPAIR (N/A Vagina) Diagnosis:       Enterocele      Cystocele with rectocele      (Pelvic prolapse with large enterocele)    Surgeons: Joseph Low MD Provider: Osvaldo Reynoso CRNA    Anesthesia Type: general ASA Status: 3          Anesthesia Type: general    Vitals  Vitals Value Taken Time   /62 12/08/21 1010   Temp 97.5 °F (36.4 °C) 12/08/21 0937   Pulse 91 12/08/21 1010   Resp 20 12/08/21 0937   SpO2 94 % 12/08/21 1010   Vitals shown include unvalidated device data.        Post Anesthesia Care and Evaluation    Patient location during evaluation: bedside  Patient participation: complete - patient participated  Level of consciousness: awake and alert  Pain score: 0  Pain management: adequate  Airway patency: patent  Anesthetic complications: No anesthetic complications  PONV Status: none  Cardiovascular status: acceptable  Respiratory status: acceptable  Hydration status: acceptable

## 2021-12-08 NOTE — ANESTHESIA PREPROCEDURE EVALUATION
Anesthesia Evaluation     Patient summary reviewed and Nursing notes reviewed   no history of anesthetic complications:  NPO Solid Status: > 8 hours  NPO Liquid Status: > 8 hours           Airway   Mallampati: II  TM distance: <3 FB  Neck ROM: full  No difficulty expected  Dental - normal exam         Pulmonary - normal exam   (+) a smoker Current Abstained day of surgery,   (-) shortness of breath  Cardiovascular - normal exam  Exercise tolerance: good (4-7 METS)    ECG reviewed    (+) hypertension 2 medications or greater, hyperlipidemia,   (-) angina      Neuro/Psych  (+) headaches, numbness, psychiatric history Anxiety and Depression,     GI/Hepatic/Renal/Endo      Musculoskeletal     (+) back pain, neck pain,   Abdominal  - normal exam   Substance History   (-) alcohol use     OB/GYN negative ob/gyn ROS         Other                        Anesthesia Plan    ASA 3     general     intravenous induction     Anesthetic plan, all risks, benefits, and alternatives have been provided, discussed and informed consent has been obtained with: patient.  Use of blood products discussed with patient  Consented to blood products.

## 2022-06-14 ENCOUNTER — TRANSCRIBE ORDERS (OUTPATIENT)
Dept: ADMINISTRATIVE | Facility: HOSPITAL | Age: 67
End: 2022-06-14

## 2022-06-14 DIAGNOSIS — Z78.0 MENOPAUSE: Primary | ICD-10-CM

## 2022-06-14 DIAGNOSIS — Z12.31 VISIT FOR SCREENING MAMMOGRAM: Primary | ICD-10-CM

## 2022-06-15 ENCOUNTER — TRANSCRIBE ORDERS (OUTPATIENT)
Dept: ADMINISTRATIVE | Facility: HOSPITAL | Age: 67
End: 2022-06-15

## 2022-06-15 DIAGNOSIS — Z78.0 POST-MENOPAUSAL: Primary | ICD-10-CM

## 2023-01-25 ENCOUNTER — TRANSCRIBE ORDERS (OUTPATIENT)
Dept: ADMINISTRATIVE | Facility: HOSPITAL | Age: 68
End: 2023-01-25
Payer: MEDICARE

## 2023-01-25 ENCOUNTER — HOSPITAL ENCOUNTER (OUTPATIENT)
Dept: GENERAL RADIOLOGY | Facility: HOSPITAL | Age: 68
Discharge: HOME | End: 2023-01-25
Admitting: INTERNAL MEDICINE
Payer: MEDICARE

## 2023-01-25 DIAGNOSIS — M79.641 BILATERAL HAND PAIN: Primary | ICD-10-CM

## 2023-01-25 DIAGNOSIS — M25.532 PAIN IN LEFT WRIST: ICD-10-CM

## 2023-01-25 DIAGNOSIS — M79.642 BILATERAL HAND PAIN: Primary | ICD-10-CM
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
## 2023-01-25 PROCEDURE — 73130 X-RAY EXAM OF HAND: CPT

## 2023-01-25 PROCEDURE — 73110 X-RAY EXAM OF WRIST: CPT

## 2023-03-02 ENCOUNTER — TRANSCRIBE ORDERS (OUTPATIENT)
Dept: ADMINISTRATIVE | Facility: HOSPITAL | Age: 68
End: 2023-03-02
Payer: MEDICARE

## 2023-03-02 DIAGNOSIS — M79.642 PAIN IN LEFT HAND: ICD-10-CM

## 2023-03-02 DIAGNOSIS — M25.532 PAIN IN LEFT WRIST: Primary | ICD-10-CM

## 2023-03-02 DIAGNOSIS — M79.641 PAIN IN RIGHT HAND: ICD-10-CM
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
## 2024-05-06 DIAGNOSIS — F41.1 GENERALIZED ANXIETY DISORDER: Primary | ICD-10-CM

## 2024-05-06 DIAGNOSIS — M79.2 INTRACTABLE NEUROPATHIC PAIN OF UPPER EXTREMITY: Primary | ICD-10-CM

## 2024-05-06 RX ORDER — HYDROCODONE BITARTRATE AND ACETAMINOPHEN 10; 325 MG/1; MG/1
1 TABLET ORAL EVERY 4 HOURS PRN
Qty: 180 TABLET | Refills: 0 | Status: SHIPPED | OUTPATIENT
Start: 2024-05-08

## 2024-05-06 RX ORDER — LORAZEPAM 1 MG/1
1 TABLET ORAL DAILY
Qty: 30 TABLET | Refills: 0 | Status: SHIPPED | OUTPATIENT
Start: 2024-05-08

## 2024-11-13 ENCOUNTER — TRANSCRIBE ORDERS (OUTPATIENT)
Dept: GENERAL RADIOLOGY | Facility: HOSPITAL | Age: 69
End: 2024-11-13
Payer: MEDICARE

## 2024-11-13 DIAGNOSIS — M25.562 LEFT KNEE PAIN, UNSPECIFIED CHRONICITY: Primary | ICD-10-CM

## (undated) DEVICE — VIOLET BRAIDED (POLYGLACTIN 910), SYNTHETIC ABSORBABLE SUTURE: Brand: COATED VICRYL

## (undated) DEVICE — VAGINAL PACKING: Brand: DEROYAL

## (undated) DEVICE — TRY SKINPREP DRYPREP

## (undated) DEVICE — SUT SILK 2/0 SH CR8 18IN CR8 C012D

## (undated) DEVICE — GOWN ,SIRUS,NONREINFORCED SMALL: Brand: MEDLINE

## (undated) DEVICE — TRAP FLD MINIVAC MEGADYNE 100ML

## (undated) DEVICE — TUBING, SUCTION, 1/4" X 12', STRAIGHT: Brand: MEDLINE

## (undated) DEVICE — COUNT NDL MAG FM/BLCK 40COUNT

## (undated) DEVICE — PREP SOL POVIDONE/IODINE BT 4OZ

## (undated) DEVICE — UNDYED BRAIDED (POLYGLACTIN 910), SYNTHETIC ABSORBABLE SUTURE: Brand: COATED VICRYL

## (undated) DEVICE — SUT VIC 2/0 CT2 27IN J269H

## (undated) DEVICE — SPNG GZ WOVN 4X4IN 12PLY 10/BX STRL

## (undated) DEVICE — IRRIGATOR BULB ASEPTO 60CC STRL

## (undated) DEVICE — DRP Z/FRICTION 10X16IN

## (undated) DEVICE — LAG PERI GYN: Brand: MEDLINE INDUSTRIES, INC.

## (undated) DEVICE — MEDI-VAC YANK SUCT HNDL W/TPRD BULBOUS TIP: Brand: CARDINAL HEALTH

## (undated) DEVICE — NDL HYPO PRECISIONGLIDE REG 25G 1 1/2

## (undated) DEVICE — SUT PDS 2/0 SH 27IN Z317H

## (undated) DEVICE — PENCL E/S ULTRAVAC TELESCP NOSE HOLSTR 10FT

## (undated) DEVICE — BOWL PLSTC LG 32OZ BLU STRL

## (undated) DEVICE — GLV SURG SENSICARE W/ALOE PF LF 7 STRL